# Patient Record
Sex: FEMALE | Race: WHITE | NOT HISPANIC OR LATINO | Employment: FULL TIME | URBAN - METROPOLITAN AREA
[De-identification: names, ages, dates, MRNs, and addresses within clinical notes are randomized per-mention and may not be internally consistent; named-entity substitution may affect disease eponyms.]

---

## 2018-10-31 ENCOUNTER — NON-PROVIDER VISIT (OUTPATIENT)
Dept: URGENT CARE | Facility: PHYSICIAN GROUP | Age: 28
End: 2018-10-31
Payer: COMMERCIAL

## 2018-10-31 DIAGNOSIS — Z23 NEED FOR VACCINATION: ICD-10-CM

## 2018-10-31 DIAGNOSIS — Z11.1 PPD SCREENING TEST: ICD-10-CM

## 2018-10-31 PROCEDURE — 86580 TB INTRADERMAL TEST: CPT | Performed by: FAMILY MEDICINE

## 2018-10-31 PROCEDURE — 90471 IMMUNIZATION ADMIN: CPT | Performed by: FAMILY MEDICINE

## 2018-10-31 PROCEDURE — 90686 IIV4 VACC NO PRSV 0.5 ML IM: CPT | Performed by: FAMILY MEDICINE

## 2018-10-31 NOTE — NON-PROVIDER
La Ozuna is a 28 y.o. female here for a non-provider visit for PPD placement -- Step 1 of 1    Reason for PPD:  work requirement    1. TB evaluation questionnaire completed by patient? Yes      -  If any answers marked yes did you contact a provider prior to placing? Not Indicated  2.  Patient notified to return to clinic for reading on: 11/02/18 after 14:12 or 11/03/18 before 14:12  3.  PPD Placement documentation completed on TB evaluation questionnaire? Yes  4.  Location of TB evaluation questionnaire filed:  file

## 2018-11-02 ENCOUNTER — NON-PROVIDER VISIT (OUTPATIENT)
Dept: URGENT CARE | Facility: PHYSICIAN GROUP | Age: 28
End: 2018-11-02

## 2018-11-02 LAB — TB WHEAL 3D P 5 TU DIAM: NORMAL MM

## 2018-11-02 NOTE — NON-PROVIDER
La Ozuna is a 28 y.o. female here for a non-provider visit for PPD reading -- Step 1 of 1.      1.  Resulted in Epic under enter/edit results? Yes   2.  TB evaluation questionnaire scanned into chart and original given to patient?Yes      3. Was induration greater than 0 mm? No.    If Step 1 of 2, when is patient returning for second step (delete if N/A): N/A    Routed to PCP? Yes   2

## 2021-06-04 ENCOUNTER — OFFICE VISIT (OUTPATIENT)
Dept: URGENT CARE | Facility: CLINIC | Age: 31
End: 2021-06-04
Payer: COMMERCIAL

## 2021-06-04 ENCOUNTER — APPOINTMENT (OUTPATIENT)
Dept: RADIOLOGY | Facility: IMAGING CENTER | Age: 31
End: 2021-06-04
Attending: NURSE PRACTITIONER
Payer: COMMERCIAL

## 2021-06-04 VITALS
RESPIRATION RATE: 16 BRPM | BODY MASS INDEX: 27.46 KG/M2 | HEIGHT: 63 IN | HEART RATE: 93 BPM | OXYGEN SATURATION: 98 % | TEMPERATURE: 98.3 F | DIASTOLIC BLOOD PRESSURE: 88 MMHG | WEIGHT: 155 LBS | SYSTOLIC BLOOD PRESSURE: 148 MMHG

## 2021-06-04 DIAGNOSIS — R07.9 CHEST PAIN, UNSPECIFIED TYPE: ICD-10-CM

## 2021-06-04 DIAGNOSIS — F41.9 ANXIETY: ICD-10-CM

## 2021-06-04 PROCEDURE — 93000 ELECTROCARDIOGRAM COMPLETE: CPT | Performed by: NURSE PRACTITIONER

## 2021-06-04 PROCEDURE — 71046 X-RAY EXAM CHEST 2 VIEWS: CPT | Mod: TC,FY | Performed by: NURSE PRACTITIONER

## 2021-06-04 PROCEDURE — 99203 OFFICE O/P NEW LOW 30 MIN: CPT | Performed by: NURSE PRACTITIONER

## 2021-06-04 RX ORDER — COVID-19 MOLECULAR TEST ASSAY
KIT MISCELLANEOUS
COMMUNITY
Start: 2021-05-01

## 2021-06-04 ASSESSMENT — ENCOUNTER SYMPTOMS
FEVER: 0
CONSTITUTIONAL NEGATIVE: 1
BACK PAIN: 0
PALPITATIONS: 0
SHORTNESS OF BREATH: 0
COUGH: 0
DIAPHORESIS: 0
NERVOUS/ANXIOUS: 1
NEUROLOGICAL NEGATIVE: 1

## 2021-06-04 ASSESSMENT — VISUAL ACUITY: OU: 1

## 2021-06-04 NOTE — PATIENT INSTRUCTIONS
Nonspecific Chest Pain, Adult  Chest pain can be caused by many different conditions. It can be caused by a condition that is life-threatening and requires treatment right away. It can also be caused by something that is not life-threatening. If you have chest pain, it can be hard to know the difference, so it is important to get help right away to make sure that you do not have a serious condition.  Some life-threatening causes of chest pain include:  · Heart attack.  · A tear in the body's main blood vessel (aortic dissection).  · Inflammation around your heart (pericarditis).  · A problem in the lungs, such as a blood clot (pulmonary embolism) or a collapsed lung (pneumothorax).  Some non life-threatening causes of chest pain include:  · Heartburn.  · Anxiety or stress.  · Damage to the bones, muscles, and cartilage that make up your chest wall.  · Pneumonia or bronchitis.  · Shingles infection (varicella-zoster virus).  Chest pain can feel like:  · Pain or discomfort on the surface of your chest or deep in your chest.  · Crushing, pressure, aching, or squeezing pain.  · Burning or tingling.  · Dull or sharp pain that is worse when you move, cough, or take a deep breath.  · Pain or discomfort that is also felt in your back, neck, jaw, shoulder, or arm, or pain that spreads to any of these areas.  Your chest pain may come and go. It may also be constant. Your health care provider will do lab tests and other studies to find the cause of your pain. Treatment will depend on the cause of your chest pain.  Follow these instructions at home:  Medicines  · Take over-the-counter and prescription medicines only as told by your health care provider.  · If you were prescribed an antibiotic, take it as told by your health care provider. Do not stop taking the antibiotic even if you start to feel better.  Lifestyle    · Rest as directed by your health care provider.  · Do not use any products that contain nicotine or tobacco,  such as cigarettes and e-cigarettes. If you need help quitting, ask your health care provider.  · Do not drink alcohol.  · Make healthy lifestyle choices as recommended. These may include:  ? Getting regular exercise. Ask your health care provider to suggest some activities that are safe for you.  ? Eating a heart-healthy diet. This includes plenty of fresh fruits and vegetables, whole grains, low-fat (lean) protein, and low-fat dairy products. A dietitian can help you find healthy eating options.  ? Maintaining a healthy weight.  ? Managing any other health conditions you have, such as high blood pressure (hypertension) or diabetes.  ? Reducing stress, such as with yoga or relaxation techniques.  General instructions  · Pay attention to any changes in your symptoms. Tell your health care provider about them or any new symptoms.  · Avoid any activities that cause chest pain.  · Keep all follow-up visits as told by your health care provider. This is important. This includes visits for any further testing if your chest pain does not go away.  Contact a health care provider if:  · Your chest pain does not go away.  · You feel depressed.  · You have a fever.  Get help right away if:  · Your chest pain gets worse.  · You have a cough that gets worse, or you cough up blood.  · You have severe pain in your abdomen.  · You faint.  · You have sudden, unexplained chest discomfort.  · You have sudden, unexplained discomfort in your arms, back, neck, or jaw.  · You have shortness of breath at any time.  · You suddenly start to sweat, or your skin gets clammy.  · You feel nausea or you vomit.  · You suddenly feel lightheaded or dizzy.  · You have severe weakness, or unexplained weakness or fatigue.  · Your heart begins to beat quickly, or it feels like it is skipping beats.  These symptoms may represent a serious problem that is an emergency. Do not wait to see if the symptoms will go away. Get medical help right away. Call your  local emergency services (911 in the [place] Do not drive yourself to the hospital.  Summary  · Chest pain can be caused by a condition that is serious and requires urgent treatment. It may also be caused by something that is not life-threatening.  · If you have chest pain, it is very important to see your health care provider. Your health care provider may do lab tests and other studies to find the cause of your pain.  · Follow your health care provider's instructions on taking medicines, making lifestyle changes, and getting emergency treatment if symptoms become worse.  · Keep all follow-up visits as told by your health care provider. This includes visits for any further testing if your chest pain does not go away.  This information is not intended to replace advice given to you by your health care provider. Make sure you discuss any questions you have with your health care provider.  Document Released: 09/27/2006 Document Revised: 06/20/2019 Document Reviewed: 06/20/2019  [employer] Patient Education © 2020 [employer]

## 2021-06-04 NOTE — PROGRESS NOTES
Subjective:     La Ozuna is a 30 y.o. female who presents for Chest Pressure (x 2 days, left side like a sharp pain )       Chest Pain   This is a new problem. The current episode started yesterday. The problem has been gradually worsening. The pain does not radiate. Pertinent negatives include no back pain, cough, diaphoresis, fever, malaise/fatigue, palpitations or shortness of breath. The pain is aggravated by nothing. She has tried nothing for the symptoms. Risk factors: Denies tobacco use. Past medical history comments: Patient denies     Patient reports that about 24 hours ago, she started to experience spontaneous onset of left-sided chest pain.  Reports that pain comes and goes.  There are no aggravating or relieving factors.  Reports that it occurs in short spurts and feels like a stabbing sensation.  Denies history of medical problems.  Not currently on prescriptions.  Denies shortness of breath, palpitations, cough, fever, or other symptoms.  However, does admit to anxiety.    Patient was screened prior to rooming and denied COVID-19 diagnosis or contact with a person who has been diagnosed or is suspected to have COVID-19. During this visit, appropriate PPE was worn, hand hygiene was performed, and the patient and any visitors were masked.     PMH:  has no past medical history on file.    MEDS:   Current Outpatient Medications:   •  ID NOW COVID-19 Kit, TEST AS DIRECTED (Patient not taking: Reported on 6/4/2021), Disp: , Rfl:     ALLERGIES: No Known Allergies    SURGHX: History reviewed. No pertinent surgical history.    SOCHX:  reports that she has never smoked. She has never used smokeless tobacco. She reports current alcohol use. She reports that she does not use drugs.     FH: Reviewed with patient, not pertinent to this visit.    Review of Systems   Constitutional: Negative.  Negative for diaphoresis, fever and malaise/fatigue.   Respiratory: Negative for cough and shortness of breath.   "  Cardiovascular: Positive for chest pain. Negative for palpitations.   Musculoskeletal: Negative for back pain.   Skin: Negative.  Negative for rash.   Neurological: Negative.    Psychiatric/Behavioral: The patient is nervous/anxious.    All other systems reviewed and are negative.    Additional details per HPI.      Objective:     /88   Pulse 93   Temp 36.8 °C (98.3 °F) (Temporal)   Resp 16   Ht 1.6 m (5' 3\")   Wt 70.3 kg (155 lb)   LMP 06/04/2021 (Exact Date)   SpO2 98%   Breastfeeding No   BMI 27.46 kg/m²     Physical Exam  Vitals reviewed.   Constitutional:       General: She is not in acute distress.     Appearance: She is well-developed. She is not ill-appearing or toxic-appearing.   HENT:      Head: Normocephalic.      Right Ear: External ear normal.      Left Ear: External ear normal.   Eyes:      General: Vision grossly intact.      Extraocular Movements: Extraocular movements intact.      Conjunctiva/sclera: Conjunctivae normal.   Cardiovascular:      Rate and Rhythm: Normal rate and regular rhythm.      Heart sounds: Normal heart sounds.   Pulmonary:      Effort: Pulmonary effort is normal. No respiratory distress.      Breath sounds: Normal breath sounds. No decreased breath sounds.   Chest:      Chest wall: No tenderness.   Musculoskeletal:         General: No deformity. Normal range of motion.      Cervical back: Normal range of motion.   Skin:     General: Skin is warm and dry.      Coloration: Skin is not pale.   Neurological:      Mental Status: She is alert and oriented to person, place, and time.      Sensory: No sensory deficit.      Motor: No weakness.      Coordination: Coordination normal.   Psychiatric:         Mood and Affect: Affect is tearful.         Behavior: Behavior normal. Behavior is cooperative.     Chest x-ray:    Details    Reading Physician Reading Date Result Priority   Nikita Lopze M.D.  [phone] 6/4/2021 [facility]      Narrative & " Impression     6/4/2021 12:45 PM     HISTORY/REASON FOR EXAM:  Left-sided chest pain.     TECHNIQUE/EXAM DESCRIPTION AND NUMBER OF VIEWS:  Two views of the chest.     COMPARISON: None.     FINDINGS:  There is no evidence of focal consolidation or evidence of pulmonary edema.  The heart is normal in size.  There is no evidence of pleural effusion.  Soft tissues and bony structures are unremarkable.    IMPRESSION:     No evidence of acute cardiopulmonary process.         Last Resulted: 06/04/21 12:57 PM        Radiology report and images reviewed by myself. Concur with findings.    EKG: sinus rhythm 87, no ectopy, no acute ST abnormalities      Assessment/Plan:     1. Chest pain, unspecified type  - DX-CHEST-2 VIEWS; Future  - EKG    2. Anxiety    Vital signs stable, afebrile, no acute distress at this time. Minimal risk factors for cardiovascular disease. Suspect symptoms related to anxiety.    Warning signs reviewed. Strict return/ER precautions advised.     Differential diagnosis, natural history, supportive care, over-the-counter symptom management per 's instructions, close monitoring, and indications for immediate follow-up discussed.     Patient advised to: Return for 1) Symptoms that worsen/don't improve, or go to ER, 2) Follow up with primary care in 7-10 days.    All questions answered. Patient agrees with the plan of care.    Discharge summary provided.